# Patient Record
Sex: FEMALE | Race: AMERICAN INDIAN OR ALASKA NATIVE
[De-identification: names, ages, dates, MRNs, and addresses within clinical notes are randomized per-mention and may not be internally consistent; named-entity substitution may affect disease eponyms.]

---

## 2019-03-04 ENCOUNTER — HOSPITAL ENCOUNTER (EMERGENCY)
Dept: HOSPITAL 5 - ED | Age: 54
LOS: 1 days | Discharge: HOME | End: 2019-03-05
Payer: COMMERCIAL

## 2019-03-04 VITALS — DIASTOLIC BLOOD PRESSURE: 97 MMHG | SYSTOLIC BLOOD PRESSURE: 169 MMHG

## 2019-03-04 DIAGNOSIS — Z91.041: ICD-10-CM

## 2019-03-04 DIAGNOSIS — Z98.51: ICD-10-CM

## 2019-03-04 DIAGNOSIS — I10: ICD-10-CM

## 2019-03-04 DIAGNOSIS — F12.90: ICD-10-CM

## 2019-03-04 DIAGNOSIS — J40: ICD-10-CM

## 2019-03-04 DIAGNOSIS — J01.00: Primary | ICD-10-CM

## 2019-03-04 LAB
APTT BLD: 26 SEC. (ref 24.2–36.6)
BASOPHILS # (AUTO): 0.1 K/MM3 (ref 0–0.1)
BASOPHILS NFR BLD AUTO: 1.1 % (ref 0–1.8)
BUN SERPL-MCNC: 16 MG/DL (ref 7–17)
BUN/CREAT SERPL: 16 %
CALCIUM SERPL-MCNC: 10 MG/DL (ref 8.4–10.2)
EOSINOPHIL # BLD AUTO: 0 K/MM3 (ref 0–0.4)
EOSINOPHIL NFR BLD AUTO: 0.8 % (ref 0–4.3)
HCT VFR BLD CALC: 38.7 % (ref 30.3–42.9)
HEMOLYSIS INDEX: 13
HGB BLD-MCNC: 13.3 GM/DL (ref 10.1–14.3)
INR PPP: 0.95 (ref 0.87–1.13)
LYMPHOCYTES # BLD AUTO: 3.2 K/MM3 (ref 1.2–5.4)
LYMPHOCYTES NFR BLD AUTO: 53.7 % (ref 13.4–35)
MCHC RBC AUTO-ENTMCNC: 34 % (ref 30–34)
MCV RBC AUTO: 87 FL (ref 79–97)
MONOCYTES # (AUTO): 0.4 K/MM3 (ref 0–0.8)
MONOCYTES % (AUTO): 7.3 % (ref 0–7.3)
PLATELET # BLD: 286 K/MM3 (ref 140–440)
RBC # BLD AUTO: 4.44 M/MM3 (ref 3.65–5.03)

## 2019-03-04 PROCEDURE — 85025 COMPLETE CBC W/AUTO DIFF WBC: CPT

## 2019-03-04 PROCEDURE — 36415 COLL VENOUS BLD VENIPUNCTURE: CPT

## 2019-03-04 PROCEDURE — 99284 EMERGENCY DEPT VISIT MOD MDM: CPT

## 2019-03-04 PROCEDURE — 84484 ASSAY OF TROPONIN QUANT: CPT

## 2019-03-04 PROCEDURE — 93010 ELECTROCARDIOGRAM REPORT: CPT

## 2019-03-04 PROCEDURE — 85610 PROTHROMBIN TIME: CPT

## 2019-03-04 PROCEDURE — 80048 BASIC METABOLIC PNL TOTAL CA: CPT

## 2019-03-04 PROCEDURE — 71046 X-RAY EXAM CHEST 2 VIEWS: CPT

## 2019-03-04 PROCEDURE — 85730 THROMBOPLASTIN TIME PARTIAL: CPT

## 2019-03-04 PROCEDURE — 93005 ELECTROCARDIOGRAM TRACING: CPT

## 2019-03-04 NOTE — XRAY REPORT
PROCEDURE:  XR CHEST ROUTINE 2V 

 

TECHNIQUE:  PA and lateral chest radiographs were obtained.  

 

HISTORY: Chest Pain 

 

COMPARISONS:  None. 

 

FINDINGS: 

 

Heart:  Normal. 

Mediastinum/Vessels: Normal. 

Lungs/Pleural space:  Normal. 

Bony thorax: No acute osseous abnormality. 

 

 

IMPRESSION:  Normal examination. 

 

This document is electronically signed by Micheal Lua MD., March 4 2019 11:28:17 PM ET

## 2019-03-04 NOTE — EMERGENCY DEPARTMENT REPORT
Chief Complaint: Chest Pain


Stated Complaint: CHEST PAIN


Time Seen by Provider: 03/04/19 22:11





- HPI


History of Present Illness: 





Pt is c/o sinus congestion, chest congestion, cough with clear sputum production

x1 week


(+) rhinorrhea 


no fever, sore throat 


pt has been taking cough medication at night which has been helping





pt is also c/o CP which began on the right side and moved to the left for the 

last week 


she states certain positions relieve her pain 


PMHx HTN, depression 


no cardiac hx 


brother with MI 


pt states she did take her BP medication this morning 








BP slightly elevated, will repeat 


otherwise VSS











MSE complete 








- Exam


Vital Signs: 


                                   Vital Signs











  03/04/19





  21:45


 


Temperature 98.5 F


 


Pulse Rate 100 H


 


Respiratory 18





Rate 


 


Blood Pressure 169/97


 


O2 Sat by Pulse 98





Oximetry 











MSE screening note: 


Focused history and physical exam performed.


Due to findings the following was ordered: CP protocol, CXR











ED Disposition for MSE


Condition: Stable

## 2019-03-05 NOTE — EMERGENCY DEPARTMENT REPORT
ED Chest Pain HPI





- General


Chief Complaint: Chest Pain


Stated Complaint: CHEST PAIN


Time Seen by Provider: 19 22:11


Source: patient


Mode of arrival: Ambulatory


Limitations: No Limitations





- History of Present Illness


Initial Comments: 


pt is a 52 y/o aaf with hx of htn who presents for left sided chest pain of 4/10

with productive cough with head and sinus congestion pt denies sob no back pain 

no labored breathing, no dizziness no lightheadedness no n/v symptom exacerbated

by activity symptoms relieved by rest pt denies fever or chills 





MD Complaint: chest pain (left lateral )


Onset/Timin


-: week(s)


Severity scale (0 -10): 3


Quality: aching


Consistency: intermittent


Improves With: rest


Worsens With: inspiration, palpation, movement, other


Context: other (URI)


re: denies: nausea, vomting, diaphoresis, dyspnea, sense of impending doom


Other Symptoms: cough.  denies: fever, rash, acid taste in mouth, leg swelling, 

palpitations, burping


Treatments Prior to Arrival: none


Aspirin use within the Past 7 Days: (0) No





- Related Data


On Oral Contraceptives: No


                                Home Medications











 Medication  Instructions  Recorded  Confirmed  Last Taken


 


Lisinopril/Hydrochlorothiazide 1 tab PO QDAY 02/25/15 02/25/15 02/24/15





[Zestoretic 20-12.5 mg]    








                                  Previous Rx's











 Medication  Instructions  Recorded  Last Taken  Type


 


EPINEPHrine [Epipen 2-Richard] 0.3 mg IM ONCE PRN #1 unit 02/25/15 Unknown Rx


 


Famotidine [Pepcid] 20 mg PO BID #8 tablet 02/25/15 Unknown Rx


 


amLODIPine [Norvasc] 10 mg PO DAILY #30 tab 02/25/15 Unknown Rx


 


diphenhydrAMINE [Benadryl] 50 mg PO Q6HR PRN #10 capsule 02/25/15 Unknown Rx


 


predniSONE [Deltasone] 50 mg PO QDAY #4 tab 02/25/15 Unknown Rx


 


ALBUTEROL Inhaler(NF) [VENTOLIN 2 puff IH Q4H PRN #1 inha 19 Unknown Rx





Inhaler(NF)]    


 


Amoxicillin/Potassium Clav 1 each PO  Days #20 tablet 19 Unknown Rx





[Augmentin 875-125 Tablet]    


 


Ibuprofen 800 mg PO TID PRN #30 tablet 19 Unknown Rx


 


diphenhydrAMINE [Benadryl CAP] 25 mg PO Q6HR PRN #30 capsule 19 Unknown Rx











                                    Allergies











Allergy/AdvReac Type Severity Reaction Status Date / Time


 


red dye AdvReac  Unknown Verified 02/25/15 06:42














Heart Score





- HEART Score


History: Slightly suspicious


EKG: Normal


Age: 45-65


Risk factors: No known risk factors


Troponin: < normal limit


HEART Score: 1





ED Review of Systems


ROS: 


Stated complaint: CHEST PAIN


Other details as noted in HPI








ED Past Medical Hx





- Past Medical History


Hx Hypertension: Yes


Hx Psychiatric Treatment: Yes (panic attacks,depression)





- Surgical History


Additional Surgical History: right breast lumpectomy,,c-

section,,tubiligation





- Social History


Smoking Status: Never Smoker


Substance Use Type: Marijuana





- Medications


Home Medications: 


                                Home Medications











 Medication  Instructions  Recorded  Confirmed  Last Taken  Type


 


EPINEPHrine [Epipen 2-Richard] 0.3 mg IM ONCE PRN #1 unit 02/25/15  Unknown Rx


 


Famotidine [Pepcid] 20 mg PO BID #8 tablet 02/25/15  Unknown Rx


 


Lisinopril/Hydrochlorothiazide 1 tab PO QDAY 02/25/15 02/25/15 02/24/15 History





[Zestoretic 20-12.5 mg]     


 


amLODIPine [Norvasc] 10 mg PO DAILY #30 tab 02/25/15  Unknown Rx


 


diphenhydrAMINE [Benadryl] 50 mg PO Q6HR PRN #10 capsule 02/25/15  Unknown Rx


 


predniSONE [Deltasone] 50 mg PO QDAY #4 tab 02/25/15  Unknown Rx


 


ALBUTEROL Inhaler(NF) [VENTOLIN 2 puff IH Q4H PRN #1 inha 19  Unknown Rx





Inhaler(NF)]     


 


Amoxicillin/Potassium Clav 1 each PO  Days #20 tablet 19  Unknown 

Rx





[Augmentin 875-125 Tablet]     


 


Ibuprofen 800 mg PO TID PRN #30 tablet 19  Unknown Rx


 


diphenhydrAMINE [Benadryl CAP] 25 mg PO Q6HR PRN #30 capsule 19  Unknown 

Rx














ED Physical Exam





- General


Limitations: No Limitations


General appearance: alert, in no apparent distress





- Head


Head exam: Present: atraumatic





- Eye


Eye exam: Present: normal appearance, PERRL, EOMI


Pupils: Present: normal accommodation





- ENT


ENT exam: Present: mucous membranes moist, other





- Expanded ENT Exam


  ** Expanded


Ear exam: Present: normal external inspection


TM/Canal exam: Erythema: Right TM, Left TM, Canal Tenderness: Right TM, Left TM


Mouth exam: Present: normal external inspection.  Absent: trismus


Throat exam: Positive: tonsillar erythema, tonsillomegaly, other (mo swj).  

Negative: tonsillar exudate, R peritonsillar mass, L peritonsillar mass





- Neck


Neck exam: Present: normal inspection, full ROM, lymphadenopathy.  Absent: 

tenderness, meningismus, thyromegaly





- Respiratory


Respiratory exam: Present: normal lung sounds bilaterally, chest wall 

tenderness.  Absent: respiratory distress, wheezes, stridor





- Cardiovascular


Cardiovascular Exam: Present: regular rate, normal rhythm, normal heart sounds. 

Absent: systolic murmur, diastolic murmur, rubs, gallop





- GI/Abdominal


GI/Abdominal exam: Present: soft, normal bowel sounds.  Absent: bruit, hernia





- Rectal


Rectal exam: Present: deferred





- Extremities Exam


Extremities exam: Present: normal inspection, full ROM, normal capillary refill.

 Absent: tenderness, pedal edema, joint swelling, calf tenderness





- Back Exam


Back exam: Present: normal inspection, full ROM.  Absent: tenderness, CVA 

tenderness (R), CVA tenderness (L), muscle spasm





- Neurological Exam


Neurological exam: Present: alert, oriented X3, CN II-XII intact, normal gait, 

reflexes normal





- Psychiatric


Psychiatric exam: Present: normal affect





- Skin


Skin exam: Present: warm, dry, intact, normal color.  Absent: rash





ED Course


                                   Vital Signs











  19





  21:45


 


Temperature 98.5 F


 


Pulse Rate 100 H


 


Respiratory 18





Rate 


 


Blood Pressure 169/97


 


O2 Sat by Pulse 98





Oximetry 














LUTHER score





- Luther Score


Age > 65: (0) No


Aspirin use within the Past 7 Days: (0) No


3 or more CAD Risk Factors: (0) No


2 or more Angina events in past 24 hrs: (0) No


Known CAD with more than 50% Stenosis: (0) No


Elevated Cardiac Markers: (0) No


ST Deviation Greater than 0.5mm: (0) No


LUTHER Score: 0





ED Medical Decision Making





- Lab Data


Result diagrams: 


                                 19 22:37





                                 19 22:37








Labs











  19





  22:37 22:37 22:37


 


WBC  5.9  


 


RBC  4.44  


 


Hgb  13.3  


 


Hct  38.7  


 


MCV  87  


 


MCH  30  


 


MCHC  34  


 


RDW  14.5  


 


Plt Count  286  


 


Lymph % (Auto)  53.7 H  


 


Mono % (Auto)  7.3  


 


Eos % (Auto)  0.8  


 


Baso % (Auto)  1.1  


 


Lymph #  3.2  


 


Mono #  0.4  


 


Eos #  0.0  


 


Baso #  0.1  


 


Seg Neutrophils %  37.1 L  


 


Seg Neutrophils #  2.2  


 


PT   13.2 


 


INR   0.95 


 


APTT   26.0 


 


Sodium    142


 


Potassium    3.5 L


 


Chloride    100.8


 


Carbon Dioxide    27


 


Anion Gap    18


 


BUN    16


 


Creatinine    1.0


 


Estimated GFR    > 60


 


BUN/Creatinine Ratio    16


 


Glucose    96


 


Calcium    10.0


 


Troponin T    < 0.010














  19





  00:29


 


WBC 


 


RBC 


 


Hgb 


 


Hct 


 


MCV 


 


MCH 


 


MCHC 


 


RDW 


 


Plt Count 


 


Lymph % (Auto) 


 


Mono % (Auto) 


 


Eos % (Auto) 


 


Baso % (Auto) 


 


Lymph # 


 


Mono # 


 


Eos # 


 


Baso # 


 


Seg Neutrophils % 


 


Seg Neutrophils # 


 


PT 


 


INR 


 


APTT 


 


Sodium 


 


Potassium 


 


Chloride 


 


Carbon Dioxide 


 


Anion Gap 


 


BUN 


 


Creatinine 


 


Estimated GFR 


 


BUN/Creatinine Ratio 


 


Glucose 


 


Calcium 


 


Troponin T  < 0.010














- EKG Data


EKG shows normal: sinus rhythm


Rate: normal





- EKG Data


When compared to previous EKG there are: no significant change


Interpretation: normal EKG (ekg interp by ed attending NSR no st elevaltion no 

ectopy )





- Radiology Data


Radiology results: report reviewed, image reviewed





cc: ZENOBIA DOUGLAS 





Fluoro Time In Minutes: 





PROCEDURE: XR CHEST ROUTINE 2V 





TECHNIQUE: PA and lateral chest radiographs were obtained. 





HISTORY: Chest Pain 





COMPARISONS: None. 





FINDINGS: 





Heart: Normal. 


Mediastinum/Vessels: Normal. 


Lungs/Pleural space: Normal. 


Bony thorax: No acute osseous abnormality. 








IMPRESSION: Normal examination. 





This document is electronically signed by Micheal Sauceda MD., 2019 

11:28:17 PM ET 





Transcribed By: CO 


Dictated By: MICHEAL SAUCEDA MD 


Electronically Authenticated By: MICHEAL SAUCEDA MD 


Signed Date/Time: 19 403 











DD/DT: 19 


TD/TT: 03/04/19 2240








- Medical Decision Making


cxr: normal no infiltrate no opacities, hear score : 1, LUTHER score: 0, trop: 

,0.01 x 2, labs normal , symptoms improve with medication given in ed, this is 

URI with Sinusitis, moderate post nasal drip, plan: augmentin, ibuprofen, 

benadryl, pt will follow up with pcp in 2-3 days and or return to emergency if 

symptoms worsen pt verbalized agreement and understanding of discharge plan. pt 

dc to home in stable condition pt is currently a./o x 3 ambulatory with steady 

gait denies cp no n/v no dizziness no shoulder pain at this time. 





Critical care attestation.: 


If time is entered above; I have spent that time in minutes in the direct care 

of this critically ill patient, excluding procedure time.








ED Disposition


Clinical Impression: 


 Bronchitis





Sinusitis


Qualifiers:


 Sinusitis location: maxillary Chronicity: acute Recurrence: non-recurrent 

Qualified Code(s): J01.00 - Acute maxillary sinusitis, unspecified





URI (upper respiratory infection)


Qualifiers:


 URI type: unspecified viral URI Qualified Code(s): J06.9 - Acute upper 

respiratory infection, unspecified





Disposition: DC-01 TO HOME OR SELFCARE


Is pt being admited?: No


Does the pt Need Aspirin: No


Condition: Stable


Instructions:  Sinusitis (ED), Upper Respiratory Infection (ED), Acute 

Bronchitis (ED)


Prescriptions: 


ALBUTEROL Inhaler(NF) [VENTOLIN Inhaler(NF)] 2 puff IH Q4H PRN #1 inha


 PRN Reason: shortness of breath wheezing 


Amoxicillin/Potassium Clav [Augmentin 875-125 Tablet] 1 each PO  Days #20

tablet


diphenhydrAMINE [Benadryl CAP] 25 mg PO Q6HR PRN #30 capsule


 PRN Reason: congestion sinus 


Ibuprofen 800 mg PO TID PRN #30 tablet


 PRN Reason: pain fever 


Referrals: 


[facility] [Outside] - 3-5 Days


Forms:  Work/School Release Form(ED)


Time of Disposition: 02:26